# Patient Record
(demographics unavailable — no encounter records)

---

## 2025-04-11 NOTE — HISTORY OF PRESENT ILLNESS
[Spouse] : spouse [FreeTextEntry1] : ANNUAL EXAM [de-identified] : 72 YO FEMALE  PMH HYPOTHROIDISM DJD  GERD  FOR ANNUAL EXAM FEELS   OK  NO MAJOR COMPLAINTS EXCEPT FOR DYSPHAGIA HJAS SEEN GI HAD EGD AND COLONOSCOPY OVERALL DOING WELL HAS BACK ISSUES  NO CP OR SOB

## 2025-04-11 NOTE — HISTORY OF PRESENT ILLNESS
[Spouse] : spouse [FreeTextEntry1] : ANNUAL EXAM [de-identified] : 70 YO FEMALE  PMH HYPOTHROIDISM DJD  GERD  FOR ANNUAL EXAM FEELS   OK  NO MAJOR COMPLAINTS EXCEPT FOR DYSPHAGIA HJAS SEEN GI HAD EGD AND COLONOSCOPY OVERALL DOING WELL HAS BACK ISSUES  NO CP OR SOB

## 2025-04-11 NOTE — PLAN
[FreeTextEntry1] :  70 YO FEMALE  PMH HYPOTHROIDISM DJD  GERD  FOR ANNUAL EXAM FEELS   OK  NO MAJOR COMPLAINTS EXCEPT FOR DYSPHAGIA HJAS SEEN GI HAD EGD AND COLONOSCOPY OVERALL DOING WELL HAS BACK ISSUES   SEES DR [place] OR GI PHYSICAL EXAM WNL  HAS LOST SOME WEIGHT HAS GIVEN CARBS BREAD ETC FOR LENT WILL CHECK LABS TODAY WILL FOLLOWUP WITH ANNETTE MANAGEMNT NEEDS DERM [place]  HAS SOME COMPLAIT SOF HAIR LOSS WILL FOLLOWUP

## 2025-07-23 NOTE — HISTORY OF PRESENT ILLNESS
[FreeTextEntry1] : FOLLOW UP ON HAIR LOSS [de-identified] : 70 YO FEMALE  PMH HYPOTHROIDISM DJD  GERD  FOR FOLLOWUP   EXAM FEELS   OK  NO MAJOR COMPLAINTS HAS  HAIR LOSS IT HAS BEEN GOING ON A LONG TIME AND SAW HER DERMATOLOGIST WHO SUGESTED EVAL OF THYROID FUNCTION  PT ON THYROID MEDS

## 2025-07-23 NOTE — PLAN
[FreeTextEntry1] : 72 YO FEMALE  PMH HYPOTHROIDISM DJD  GERD  FOR FOLLOWUP   EXAM FEELS   OK  NO MAJOR COMPLAINTS HAS  HAIR LOSS IT HAS BEEN GOING ON A LONG TIME AND SAW HER DERMATOLOGIST WHO SUGESTED EVAL OF THYROID FUNCTION  PT ON THYROID MEDS  PT LAST THYRODI FUNCTIONT TEST WERE DONE IN  APRIL WILL RECOMMEND REPEAT TFT  MAY NEED TO ADJUST THE LEVOTHYROXINE  WILL  REFER TO ENDOCRINE IF ISSUES WILL ALSO CHECK BREA SED RATE THAT MAY SUGGEST  OTHER CAUSES   EXPLAINED TO PT ETIOLOGY OF HAIR LOSS IS  DIFFICULT TO DISCOVER ROSY MUNIZ